# Patient Record
Sex: MALE | Race: WHITE | Employment: FULL TIME | URBAN - METROPOLITAN AREA
[De-identification: names, ages, dates, MRNs, and addresses within clinical notes are randomized per-mention and may not be internally consistent; named-entity substitution may affect disease eponyms.]

---

## 2024-01-18 ENCOUNTER — HOSPITAL ENCOUNTER (EMERGENCY)
Age: 44
Discharge: ANOTHER HOSPITAL | End: 2024-01-18
Attending: EMERGENCY MEDICINE
Payer: COMMERCIAL

## 2024-01-18 ENCOUNTER — HOSPITAL ENCOUNTER (INPATIENT)
Age: 44
LOS: 1 days | Discharge: HOME | End: 2024-01-19
Attending: PSYCHIATRY & NEUROLOGY | Admitting: PSYCHIATRY & NEUROLOGY
Payer: COMMERCIAL

## 2024-01-18 VITALS
SYSTOLIC BLOOD PRESSURE: 152 MMHG | HEART RATE: 80 BPM | OXYGEN SATURATION: 99 % | WEIGHT: 265 LBS | TEMPERATURE: 98.9 F | DIASTOLIC BLOOD PRESSURE: 89 MMHG | HEIGHT: 70 IN | RESPIRATION RATE: 20 BRPM | BODY MASS INDEX: 37.94 KG/M2

## 2024-01-18 DIAGNOSIS — R45.851 SUICIDAL IDEATION: Primary | ICD-10-CM

## 2024-01-18 PROBLEM — F33.9 MAJOR DEPRESSIVE DISORDER, RECURRENT (HCC): Status: ACTIVE | Noted: 2024-01-18

## 2024-01-18 PROBLEM — F33.9 MAJOR DEPRESSION, RECURRENT (HCC): Status: ACTIVE | Noted: 2024-01-18

## 2024-01-18 LAB
AMPHETAMINES UR QL SCN>1000 NG/ML: ABNORMAL
ANION GAP SERPL CALCULATED.3IONS-SCNC: 9 MMOL/L (ref 3–16)
APAP SERPL-MCNC: <5 UG/ML (ref 10–30)
BARBITURATES UR QL SCN>200 NG/ML: ABNORMAL
BASOPHILS # BLD: 0.1 K/UL (ref 0–0.2)
BASOPHILS NFR BLD: 1 %
BENZODIAZ UR QL SCN>200 NG/ML: ABNORMAL
BUN SERPL-MCNC: 15 MG/DL (ref 7–20)
CALCIUM SERPL-MCNC: 9.8 MG/DL (ref 8.3–10.6)
CANNABINOIDS UR QL SCN>50 NG/ML: POSITIVE
CHLORIDE SERPL-SCNC: 102 MMOL/L (ref 99–110)
CO2 SERPL-SCNC: 25 MMOL/L (ref 21–32)
COCAINE UR QL SCN: ABNORMAL
CREAT SERPL-MCNC: 0.9 MG/DL (ref 0.9–1.3)
DEPRECATED RDW RBC AUTO: 13.5 % (ref 12.4–15.4)
DRUG SCREEN COMMENT UR-IMP: ABNORMAL
EOSINOPHIL # BLD: 0.1 K/UL (ref 0–0.6)
EOSINOPHIL NFR BLD: 1 %
ETHANOLAMINE SERPL-MCNC: NORMAL MG/DL (ref 0–0.08)
FENTANYL SCREEN, URINE: ABNORMAL
FLUAV RNA RESP QL NAA+PROBE: NOT DETECTED
FLUBV RNA RESP QL NAA+PROBE: NOT DETECTED
GFR SERPLBLD CREATININE-BSD FMLA CKD-EPI: >60 ML/MIN/{1.73_M2}
GLUCOSE BLD-MCNC: 168 MG/DL (ref 70–99)
GLUCOSE BLD-MCNC: 78 MG/DL (ref 70–99)
GLUCOSE SERPL-MCNC: 107 MG/DL (ref 70–99)
HCT VFR BLD AUTO: 45.7 % (ref 40.5–52.5)
HGB BLD-MCNC: 15.7 G/DL (ref 13.5–17.5)
LYMPHOCYTES # BLD: 1.5 K/UL (ref 1–5.1)
LYMPHOCYTES NFR BLD: 17.1 %
MCH RBC QN AUTO: 29.9 PG (ref 26–34)
MCHC RBC AUTO-ENTMCNC: 34.3 G/DL (ref 31–36)
MCV RBC AUTO: 87.2 FL (ref 80–100)
METHADONE UR QL SCN>300 NG/ML: ABNORMAL
MONOCYTES # BLD: 0.4 K/UL (ref 0–1.3)
MONOCYTES NFR BLD: 4.9 %
NEUTROPHILS # BLD: 6.7 K/UL (ref 1.7–7.7)
NEUTROPHILS NFR BLD: 76 %
OPIATES UR QL SCN>300 NG/ML: ABNORMAL
OXYCODONE UR QL SCN: ABNORMAL
PCP UR QL SCN>25 NG/ML: ABNORMAL
PERFORMED ON: ABNORMAL
PERFORMED ON: NORMAL
PH UR STRIP: 6 [PH]
PLATELET # BLD AUTO: 330 K/UL (ref 135–450)
PMV BLD AUTO: 6.9 FL (ref 5–10.5)
POTASSIUM SERPL-SCNC: 3.9 MMOL/L (ref 3.5–5.1)
RBC # BLD AUTO: 5.23 M/UL (ref 4.2–5.9)
SALICYLATES SERPL-MCNC: <0.3 MG/DL (ref 15–30)
SARS-COV-2 RNA RESP QL NAA+PROBE: NOT DETECTED
SODIUM SERPL-SCNC: 136 MMOL/L (ref 136–145)
WBC # BLD AUTO: 8.9 K/UL (ref 4–11)

## 2024-01-18 PROCEDURE — 1240000000 HC EMOTIONAL WELLNESS R&B

## 2024-01-18 PROCEDURE — 90791 PSYCH DIAGNOSTIC EVALUATION: CPT | Performed by: SOCIAL WORKER

## 2024-01-18 PROCEDURE — 80179 DRUG ASSAY SALICYLATE: CPT

## 2024-01-18 PROCEDURE — 82077 ASSAY SPEC XCP UR&BREATH IA: CPT

## 2024-01-18 PROCEDURE — 99285 EMERGENCY DEPT VISIT HI MDM: CPT

## 2024-01-18 PROCEDURE — 85025 COMPLETE CBC W/AUTO DIFF WBC: CPT

## 2024-01-18 PROCEDURE — 80048 BASIC METABOLIC PNL TOTAL CA: CPT

## 2024-01-18 PROCEDURE — 6370000000 HC RX 637 (ALT 250 FOR IP): Performed by: NURSE PRACTITIONER

## 2024-01-18 PROCEDURE — 6370000000 HC RX 637 (ALT 250 FOR IP): Performed by: EMERGENCY MEDICINE

## 2024-01-18 PROCEDURE — 87636 SARSCOV2 & INF A&B AMP PRB: CPT

## 2024-01-18 PROCEDURE — 80143 DRUG ASSAY ACETAMINOPHEN: CPT

## 2024-01-18 PROCEDURE — 80307 DRUG TEST PRSMV CHEM ANLYZR: CPT

## 2024-01-18 RX ORDER — LISINOPRIL 20 MG/1
40 TABLET ORAL ONCE
Status: COMPLETED | OUTPATIENT
Start: 2024-01-18 | End: 2024-01-18

## 2024-01-18 RX ORDER — POLYETHYLENE GLYCOL 3350 17 G
2 POWDER IN PACKET (EA) ORAL
Status: CANCELLED | OUTPATIENT
Start: 2024-01-18

## 2024-01-18 RX ORDER — IBUPROFEN 400 MG/1
400 TABLET ORAL EVERY 6 HOURS PRN
Status: CANCELLED | OUTPATIENT
Start: 2024-01-18

## 2024-01-18 RX ORDER — BENZTROPINE MESYLATE 1 MG/ML
2 INJECTION INTRAMUSCULAR; INTRAVENOUS 2 TIMES DAILY PRN
Status: CANCELLED | OUTPATIENT
Start: 2024-01-18

## 2024-01-18 RX ORDER — ACETAMINOPHEN 325 MG/1
650 TABLET ORAL EVERY 4 HOURS PRN
Status: CANCELLED | OUTPATIENT
Start: 2024-01-18

## 2024-01-18 RX ORDER — IBUPROFEN 400 MG/1
400 TABLET ORAL EVERY 6 HOURS PRN
Status: DISCONTINUED | OUTPATIENT
Start: 2024-01-18 | End: 2024-01-19 | Stop reason: HOSPADM

## 2024-01-18 RX ORDER — LORAZEPAM 1 MG/1
2 TABLET ORAL ONCE
Status: COMPLETED | OUTPATIENT
Start: 2024-01-18 | End: 2024-01-18

## 2024-01-18 RX ORDER — BENZTROPINE MESYLATE 1 MG/ML
2 INJECTION INTRAMUSCULAR; INTRAVENOUS 2 TIMES DAILY PRN
Status: DISCONTINUED | OUTPATIENT
Start: 2024-01-18 | End: 2024-01-19 | Stop reason: HOSPADM

## 2024-01-18 RX ORDER — POLYETHYLENE GLYCOL 3350 17 G
2 POWDER IN PACKET (EA) ORAL
Status: DISCONTINUED | OUTPATIENT
Start: 2024-01-18 | End: 2024-01-19 | Stop reason: HOSPADM

## 2024-01-18 RX ORDER — NICOTINE 21 MG/24HR
1 PATCH, TRANSDERMAL 24 HOURS TRANSDERMAL DAILY
Status: CANCELLED | OUTPATIENT
Start: 2024-01-18

## 2024-01-18 RX ORDER — OLANZAPINE 10 MG/1
10 TABLET ORAL EVERY 4 HOURS PRN
Status: DISCONTINUED | OUTPATIENT
Start: 2024-01-18 | End: 2024-01-19 | Stop reason: HOSPADM

## 2024-01-18 RX ORDER — MAGNESIUM HYDROXIDE/ALUMINUM HYDROXICE/SIMETHICONE 120; 1200; 1200 MG/30ML; MG/30ML; MG/30ML
30 SUSPENSION ORAL EVERY 6 HOURS PRN
Status: DISCONTINUED | OUTPATIENT
Start: 2024-01-18 | End: 2024-01-19 | Stop reason: HOSPADM

## 2024-01-18 RX ORDER — NICOTINE 21 MG/24HR
1 PATCH, TRANSDERMAL 24 HOURS TRANSDERMAL DAILY
Status: DISCONTINUED | OUTPATIENT
Start: 2024-01-19 | End: 2024-01-19 | Stop reason: HOSPADM

## 2024-01-18 RX ORDER — INSULIN GLARGINE 100 [IU]/ML
70 INJECTION, SOLUTION SUBCUTANEOUS EVERY MORNING
COMMUNITY

## 2024-01-18 RX ORDER — ACETAMINOPHEN 325 MG/1
650 TABLET ORAL EVERY 4 HOURS PRN
Status: DISCONTINUED | OUTPATIENT
Start: 2024-01-18 | End: 2024-01-19 | Stop reason: HOSPADM

## 2024-01-18 RX ORDER — MAGNESIUM HYDROXIDE/ALUMINUM HYDROXICE/SIMETHICONE 120; 1200; 1200 MG/30ML; MG/30ML; MG/30ML
30 SUSPENSION ORAL EVERY 6 HOURS PRN
Status: CANCELLED | OUTPATIENT
Start: 2024-01-18

## 2024-01-18 RX ORDER — OLANZAPINE 5 MG/1
10 TABLET ORAL EVERY 4 HOURS PRN
Status: CANCELLED | OUTPATIENT
Start: 2024-01-18

## 2024-01-18 RX ADMIN — LISINOPRIL 40 MG: 20 TABLET ORAL at 20:50

## 2024-01-18 RX ADMIN — LORAZEPAM 2 MG: 1 TABLET ORAL at 12:49

## 2024-01-18 ASSESSMENT — SLEEP AND FATIGUE QUESTIONNAIRES
DO YOU USE A SLEEP AID: NO
SLEEP PATTERN: DIFFICULTY FALLING ASLEEP;DISTURBED/INTERRUPTED SLEEP;RESTLESSNESS;NIGHTMARES/TERRORS
DO YOU USE A SLEEP AID: COMMENT
AVERAGE NUMBER OF SLEEP HOURS: 5
SLEEP PATTERN: DIFFICULTY FALLING ASLEEP;DISTURBED/INTERRUPTED SLEEP
DO YOU HAVE DIFFICULTY SLEEPING: YES
DO YOU HAVE DIFFICULTY SLEEPING: YES
AVERAGE NUMBER OF SLEEP HOURS: 5

## 2024-01-18 ASSESSMENT — PAIN SCALES - GENERAL: PAINLEVEL_OUTOF10: 0

## 2024-01-18 ASSESSMENT — PATIENT HEALTH QUESTIONNAIRE - PHQ9
SUM OF ALL RESPONSES TO PHQ QUESTIONS 1-9: 2
2. FEELING DOWN, DEPRESSED OR HOPELESS: 1
SUM OF ALL RESPONSES TO PHQ QUESTIONS 1-9: 2
SUM OF ALL RESPONSES TO PHQ QUESTIONS 1-9: 2
1. LITTLE INTEREST OR PLEASURE IN DOING THINGS: 1
SUM OF ALL RESPONSES TO PHQ9 QUESTIONS 1 & 2: 2
SUM OF ALL RESPONSES TO PHQ QUESTIONS 1-9: 2

## 2024-01-18 ASSESSMENT — PAIN - FUNCTIONAL ASSESSMENT: PAIN_FUNCTIONAL_ASSESSMENT: 0-10

## 2024-01-18 ASSESSMENT — LIFESTYLE VARIABLES
HOW MANY STANDARD DRINKS CONTAINING ALCOHOL DO YOU HAVE ON A TYPICAL DAY: 1 OR 2
HOW OFTEN DO YOU HAVE A DRINK CONTAINING ALCOHOL: MONTHLY OR LESS

## 2024-01-18 NOTE — ED NOTES
All clothing removed and placed in a patient belonging bag. This includes pants, belt, shoes, socks, shirt, and jacket. All items are outside the door in a belongings bag.  Handoff given to RONDA Valladares who is sitting with patient at this time. Patient's wife is at his bedside. Her personal belongings are outside the door as well.

## 2024-01-18 NOTE — ED PROVIDER NOTES
Emergency Department Attending Physician Note  Location: [facility]  ED  1/18/2024       Pt Name: Jordan Hernandez  MRN: [id number]  Birthdate 1980    Date of evaluation: 1/18/2024  Provider: Dustin Auguste MD  PCP: No primary care provider on file.    Note Started: 11:30 AM EST 1/18/24    CHIEF COMPLAINT  Chief Complaint   Patient presents with    Suicidal     Patient tearful at triage. Pt reports he's having thoughts of killing himself as long as he can remember. Pt hx of PTSD and reports he's had depression since his daughter passed away in 2018. Says he's thought about going into the woods and hanging himself. Dr Gee at triage with patient speaking to him. Patient has a good support system. Wife with him at triage.        HISTORY OF PRESENT ILLNESS:  History obtained by patient. Limitations to history : None.     Jordan Hernandez is a 43 y.o. male with a significant PMHx of PTSD presenting for evaluation of suicidal thoughts. Patient reported multiple stressors in life, including work and family. Patient reported losing multiple family members, including his daughter in 2018. Patient stated he has SI for a prolong period of time, without plan or intent to harm. Patient stated that with his current SI, he wants to hang himself in his backyard. Patient denies any HI to anyone around him. Patient denies any ingestion of drugs beside marijuana. Patient is tearful on presentation. Patient denies history of previous suicide attempts..     Nursing Notes were all reviewed and agreed with or any disagreements were addressed in the HPI.      MEDICAL HISTORY  Past Medical History:   Diagnosis Date    PTSD (post-traumatic stress disorder)         SURGICAL HISTORY  Past Surgical History:   Procedure Laterality Date    CHOLECYSTECTOMY         CURRENT MEDICATIONS  Previous Medications    No medications on file       ALLERGIES  Patient has no known allergies.    FAMILYHISTORY  History reviewed. No

## 2024-01-18 NOTE — CARE COORDINATION
Referral to transfer center for psych. Transfer.  Electronically signed by MELISSA Díaz on 1/18/2024 at 1:54 PM

## 2024-01-18 NOTE — ED NOTES
Patient tearfully stated \"on the way here I thought about just jerking the wheel but I didn't want to hurt someone else because I'm having a shitty life right now\" Patient also stated that \"getting help feels embarrasing\" sitter reassured patient that they are heppy he is getting help.    DISPLAY PLAN FREE TEXT

## 2024-01-18 NOTE — ED PROVIDER NOTES
I independently evaluated and obtained a history and physical on Jordan Hernandez.    I personally saw the patient and made/approved the management plan and take responsibility for the patient management.    All diagnostic, treatment, and disposition assistants were made to myself in conjunction the resident physician.    For further details of this patient's emergency department encounter, please see the resident physician's documentation.    History: 43-year-old male who reports he has been diagnosed with PTSD from prior  service who reports multiple stressors including his daughter passing away 6 years ago and other family members dying as well who reports mounting stress and thoughts of suicide.  Patient reports that thoughts of suicide have been ongoing for a prolonged period of time but now he has an acute specific plan to hang himself in his backyard.  Patient denies any ingestions or self-harm prior to coming to the emergency department.  Patient denies any fever or medical symptoms.  Patient reports that he is scared that he might harm himself if he does not come in and therefore is coming and ask for help.    Physician Exam: Tearful emotionally distressed middle-aged  male.  Nontoxic-appearing.  Awake and alert.  Moves all 4 extremities spontaneously.  Reports active suicidal ideation with plan.    MDM: Patient has active suicidal ideation with plan and therefore was placed on a 72-hour hold for his own safety.  Patient is agreeable to take p.o. Ativan for his acute anxiety.  Standard psychiatric labs ordered do not show any acute emergent abnormalities.  Patient is medically cleared and appropriate for psychiatric evaluation.  Psychiatric evaluation takes place and they recommend inpatient admission given active suicidal ideation with plan and numerous risk factors.  I have discussed this with the patient who expresses understanding and agreement with this plan.      FINAL IMPRESSION

## 2024-01-18 NOTE — VIRTUAL HEALTH
high risk on CSSRS with plan to drive into oncoming traffic reporting,  \"I feel like I want to kill myself, I don't want to be here, today is the closest I have been to actually doing it. I kept thinking about pulling the wheel. I have been dealing with it with it for 20 years.\" Pt continues to meet the criteria for hold at this time. Telepsych recommends for pt to be admitted to inpatient facility with ED provider, Justin Gee MD, in agreement.         Jordan Hernandez, was evaluated through a synchronous (real-time) audio-video encounter. The patient (and/or guardian if applicable) is aware that this is a billable service, which includes applicable co-pays. This virtual visit was conducted with patient's (and/or legal guardian's) consent. Patient identification was verified, and a caregiver was present when appropriate.  The patient was located at Facility (Appt Department): [facility]  ED  [address] [facility] [address]  Loc: [phone]  The provider was located at Home (City/State): [place], [place]    [place] Consult to Tele-Psych  Consult performed by: Gareth Shen LCSW  Consult ordered by: Justin Gee MD           Total time spent on this encounter: Not billed by time    --Gareth Shen LCSW on 1/18/2024 at 12:03 PM    An electronic signature was used to authenticate this note.

## 2024-01-19 VITALS
SYSTOLIC BLOOD PRESSURE: 132 MMHG | WEIGHT: 264.6 LBS | BODY MASS INDEX: 37.88 KG/M2 | OXYGEN SATURATION: 96 % | HEIGHT: 70 IN | RESPIRATION RATE: 16 BRPM | HEART RATE: 89 BPM | DIASTOLIC BLOOD PRESSURE: 67 MMHG | TEMPERATURE: 97.7 F

## 2024-01-19 PROBLEM — G47.30 SLEEP APNEA, UNSPECIFIED: Status: ACTIVE | Noted: 2024-01-19

## 2024-01-19 PROBLEM — F43.12 POST-TRAUMATIC STRESS DISORDER, CHRONIC: Status: ACTIVE | Noted: 2024-01-19

## 2024-01-19 PROBLEM — E78.5 HYPERLIPIDEMIA: Status: ACTIVE | Noted: 2024-01-19

## 2024-01-19 PROBLEM — E11.9 TYPE 2 DIABETES MELLITUS (HCC): Status: ACTIVE | Noted: 2024-01-19

## 2024-01-19 PROBLEM — F33.2 SEVERE EPISODE OF RECURRENT MAJOR DEPRESSIVE DISORDER, WITHOUT PSYCHOTIC FEATURES (HCC): Status: ACTIVE | Noted: 2024-01-18

## 2024-01-19 PROBLEM — I10 ESSENTIAL (PRIMARY) HYPERTENSION: Status: ACTIVE | Noted: 2024-01-19

## 2024-01-19 PROBLEM — E66.01 MORBID OBESITY (HCC): Status: ACTIVE | Noted: 2024-01-19

## 2024-01-19 PROBLEM — K21.9 GASTROESOPHAGEAL REFLUX DISEASE: Status: ACTIVE | Noted: 2024-01-19

## 2024-01-19 LAB
CHOLEST SERPL-MCNC: 163 MG/DL (ref 0–199)
GLUCOSE BLD-MCNC: 137 MG/DL (ref 70–99)
GLUCOSE BLD-MCNC: 138 MG/DL (ref 70–99)
GLUCOSE BLD-MCNC: 150 MG/DL (ref 70–99)
HDLC SERPL-MCNC: 36 MG/DL (ref 40–60)
LDLC SERPL CALC-MCNC: 85 MG/DL
PERFORMED ON: ABNORMAL
TRIGL SERPL-MCNC: 208 MG/DL (ref 0–150)
TSH SERPL DL<=0.005 MIU/L-ACNC: 1.01 UIU/ML (ref 0.27–4.2)
VLDLC SERPL CALC-MCNC: 42 MG/DL

## 2024-01-19 PROCEDURE — 6370000000 HC RX 637 (ALT 250 FOR IP): Performed by: PSYCHIATRY & NEUROLOGY

## 2024-01-19 PROCEDURE — 5130000000 HC BRIDGE APPOINTMENT

## 2024-01-19 PROCEDURE — 84443 ASSAY THYROID STIM HORMONE: CPT

## 2024-01-19 PROCEDURE — 6370000000 HC RX 637 (ALT 250 FOR IP): Performed by: NURSE PRACTITIONER

## 2024-01-19 PROCEDURE — 36415 COLL VENOUS BLD VENIPUNCTURE: CPT

## 2024-01-19 PROCEDURE — 99223 1ST HOSP IP/OBS HIGH 75: CPT | Performed by: PSYCHIATRY & NEUROLOGY

## 2024-01-19 PROCEDURE — 99221 1ST HOSP IP/OBS SF/LOW 40: CPT | Performed by: NURSE PRACTITIONER

## 2024-01-19 PROCEDURE — 80061 LIPID PANEL: CPT

## 2024-01-19 PROCEDURE — 83036 HEMOGLOBIN GLYCOSYLATED A1C: CPT

## 2024-01-19 RX ORDER — INSULIN LISPRO 100 [IU]/ML
20 INJECTION, SOLUTION INTRAVENOUS; SUBCUTANEOUS
Qty: 1 EACH | Refills: 0
Start: 2024-01-20

## 2024-01-19 RX ORDER — PRAVASTATIN SODIUM 40 MG
40 TABLET ORAL NIGHTLY
Status: DISCONTINUED | OUTPATIENT
Start: 2024-01-19 | End: 2024-01-19 | Stop reason: HOSPADM

## 2024-01-19 RX ORDER — INSULIN LISPRO 100 [IU]/ML
20 INJECTION, SOLUTION INTRAVENOUS; SUBCUTANEOUS
Status: DISCONTINUED | OUTPATIENT
Start: 2024-01-19 | End: 2024-01-19 | Stop reason: HOSPADM

## 2024-01-19 RX ORDER — PANTOPRAZOLE SODIUM 40 MG/1
40 TABLET, DELAYED RELEASE ORAL
Status: DISCONTINUED | OUTPATIENT
Start: 2024-01-20 | End: 2024-01-19 | Stop reason: HOSPADM

## 2024-01-19 RX ORDER — DEXTROSE MONOHYDRATE 100 MG/ML
INJECTION, SOLUTION INTRAVENOUS CONTINUOUS PRN
Status: DISCONTINUED | OUTPATIENT
Start: 2024-01-19 | End: 2024-01-19 | Stop reason: HOSPADM

## 2024-01-19 RX ORDER — PANTOPRAZOLE SODIUM 40 MG/1
40 TABLET, DELAYED RELEASE ORAL
Qty: 30 TABLET | Refills: 0
Start: 2024-01-20

## 2024-01-19 RX ORDER — GLUCAGON 1 MG/ML
1 KIT INJECTION PRN
Status: DISCONTINUED | OUTPATIENT
Start: 2024-01-19 | End: 2024-01-19 | Stop reason: HOSPADM

## 2024-01-19 RX ORDER — LISINOPRIL 40 MG/1
40 TABLET ORAL DAILY
Qty: 30 TABLET | Refills: 0
Start: 2024-01-20

## 2024-01-19 RX ORDER — LISINOPRIL 20 MG/1
40 TABLET ORAL DAILY
Status: DISCONTINUED | OUTPATIENT
Start: 2024-01-19 | End: 2024-01-19 | Stop reason: HOSPADM

## 2024-01-19 RX ORDER — PRAVASTATIN SODIUM 40 MG
40 TABLET ORAL NIGHTLY
Qty: 30 TABLET | Refills: 0
Start: 2024-01-19

## 2024-01-19 RX ORDER — INSULIN LISPRO 100 [IU]/ML
25 INJECTION, SOLUTION INTRAVENOUS; SUBCUTANEOUS
Status: DISCONTINUED | OUTPATIENT
Start: 2024-01-19 | End: 2024-01-19 | Stop reason: HOSPADM

## 2024-01-19 RX ORDER — INSULIN LISPRO 100 [IU]/ML
25 INJECTION, SOLUTION INTRAVENOUS; SUBCUTANEOUS
Qty: 1 EACH | Refills: 0
Start: 2024-01-19

## 2024-01-19 RX ORDER — INSULIN GLARGINE 100 [IU]/ML
70 INJECTION, SOLUTION SUBCUTANEOUS DAILY
Status: DISCONTINUED | OUTPATIENT
Start: 2024-01-19 | End: 2024-01-19 | Stop reason: HOSPADM

## 2024-01-19 RX ADMIN — INSULIN LISPRO 20 UNITS: 100 INJECTION, SOLUTION INTRAVENOUS; SUBCUTANEOUS at 12:27

## 2024-01-19 RX ADMIN — INSULIN GLARGINE 70 UNITS: 100 INJECTION, SOLUTION SUBCUTANEOUS at 09:52

## 2024-01-19 RX ADMIN — SERTRALINE HYDROCHLORIDE 25 MG: 50 TABLET ORAL at 15:15

## 2024-01-19 RX ADMIN — LISINOPRIL 40 MG: 20 TABLET ORAL at 13:50

## 2024-01-19 ASSESSMENT — SLEEP AND FATIGUE QUESTIONNAIRES
DO YOU USE A SLEEP AID: NO
DO YOU HAVE DIFFICULTY SLEEPING: YES
AVERAGE NUMBER OF SLEEP HOURS: 5
SLEEP PATTERN: DIFFICULTY FALLING ASLEEP
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
## 2024-01-19 NOTE — PROGRESS NOTES
[facility] [place] listed as pharmacy. Unable to reach them at this time. Primary nurse updating home medications per pt report.

## 2024-01-19 NOTE — BH NOTE
[facility] [place]  Discharge Note    Pt discharged with followings belongings:   Dental Appliances: None  Vision - Corrective Lenses: Eyeglasses  Hearing Aid: None  Jewelry: None  Body Piercings Removed: N/A  Clothing: Belt, Jacket/Coat, Pants, Socks, Footwear  Other Valuables: Other (Comment) (na)   Valuables returned to patient. Patient educated on aftercare instructions: Yes  Information faxed to [facility] and [facility] by BARRY Rivera  at 4:28 PM .Patient verbalize understanding of AVS:  Yes.    Status EXAM upon discharge:  Mental Status and Behavioral Exam  Normal: No  Level of Assistance: Independent/Self  Facial Expression: Brightened  Affect: Appropriate, Congruent  Level of Consciousness: Alert  Frequency of Checks: 4 times per hour, close  Mood:Normal: No  Mood: Anxious, Angry  Motor Activity:Normal: Yes  Eye Contact: Good  Observed Behavior: Cooperative, Friendly  Sexual Misconduct History: Current - no  Preception: Murray to person, Murray to time, Murray to place, Murray to situation  Attention:Normal: Yes  Attention: Distractible  Thought Processes: Unremarkable (Linear)  Thought Content:Normal: Yes  Depression Symptoms: Increased irritability  Anxiety Symptoms: Generalized  Lyn Symptoms: No problems reported or observed.  Hallucinations: None  Delusions: No  Memory:Normal: Yes  Insight and Judgment: No  Insight and Judgment: Poor judgment    Tobacco Screening:  Practical Counseling, on admission, mary X, if applicable and completed (first 3 are required if patient doesn't refuse):  Pt does not smoke          ( ) Recognizing danger situations (included triggers and roadblocks)                    ( ) Coping skills (new ways to manage stress,relaxation techniques, changing routine, distraction)                                                           ( ) Basic information about quitting (benefits of quitting, techniques in how to quit, available resources  ( ) Referral for counseling faxed to

## 2024-01-19 NOTE — TRANSITION OF CARE
Behavioral Health Transition Record to Provider    Patient Name: Jordan Hernandez  YOB: 1980   Medical Record Number: [id number]  Date of Admission: 1/18/2024  7:51 PM   Date of Discharge: 1/19/24    Attending Provider: Wei Gunderson MD   Discharging Provider: Dr. Gunderson  To contact this individual call [phone] and ask the  to page.  If unavailable, ask to be transferred to Behavioral Health Provider on call.  A Behavioral Health Provider will be available on call 24/7 and during holidays.    Primary Care Provider: No primary care provider on file.    No Known Allergies    Reason for Admission: MDD    Admission Diagnosis: Major depression, recurrent (HCC) [F33.9]  Major depressive disorder, recurrent (HCC) [F33.9]    * No surgery found *    Results for orders placed or performed during the hospital encounter of 01/18/24   TSH   Result Value Ref Range    TSH 1.01 0.27 - 4.20 uIU/mL   POCT Glucose   Result Value Ref Range    POC Glucose 168 (H) 70 - 99 mg/dl    Performed on ACCU-CHEK    POCT Glucose   Result Value Ref Range    POC Glucose 138 (H) 70 - 99 mg/dl    Performed on ACCU-CHEK    POCT Glucose   Result Value Ref Range    POC Glucose 137 (H) 70 - 99 mg/dl    Performed on ACCU-CHEK    POCT Glucose   Result Value Ref Range    POC Glucose 150 (H) 70 - 99 mg/dl    Performed on ACCU-CHEK        Immunizations administered during this encounter:   There is no immunization history on file for this patient.  Documentation of patient's or caregiver's refusal of influenza vaccine.    Screening for Metabolic Disorders for Patients on Antipsychotic Medications  (Data obtained from the EMR)    Estimated Body Mass Index  Body mass index is 37.97 kg/m².      Vital Signs/Blood Pressure  /67   Pulse 89   Temp 97.7 °F (36.5 °C) (Oral)   Resp 16   Ht 1.778 m (5' 10\")   Wt 120 kg (264 lb 9.6 oz)   SpO2 96%   BMI 37.97 kg/m²      Fasting Blood Glucose or Hemoglobin A1c  No results

## 2024-01-19 NOTE — PROGRESS NOTES
Writer unable to verify home medications, but pt states he takes these medications as follows:    Insulin Glargine- 70 units every morning    Novolog- 20 units at breakfast, 20 units at lunch, 20 units at dinner    Metformin- 500 mg twice BID, morning/night    Omeprazole- 20 mg daily every morning    Pravastatin- 20 mg daily every morning    Lisinopril- 40 mg daily every morning    Jardiance- 10 mg daily every morning      Pt states the only medications he was able to take on 01/17/2024 prior to admission was the breakfast and lunch dose of Novolog.     Pt BS for this shift as follows:  3282- 031  0100- 138  8780-

## 2024-01-19 NOTE — PROGRESS NOTES
CSSR-S Assessment completed with patient at 2002 who then scored HIGH.    Mamie Velazquez was notified of HIGH score, via telephone, at 2020.    Were suicide precautions ordered: NO    If not ordered, justification as follows: Pt currently denies all SI/HI/AVH. Pt states he feels better coming here, he feels safe here, and will not hurt himself. Pt agreed to come talk to staff immediately if this changes.     Completed by Tess Lee

## 2024-01-19 NOTE — H&P
Hospital Medicine History & Physical      PCP: No primary care provider on file.    Date of Admission: 1/18/2024    Date of Service: Pt seen/examined on 01/19/24      Chief Complaint:  SI        History Of Present Illness:      The patient is a 43 y.o. male with PMH of PTSD, DM, HTN, ANEL, HLD and GERD who presented to [facility] for SI.  Patient was seen and evaluated in the ED by the ED medical provider, patient was medically cleared for admission to [facility].  This note serves as an admission medical H&P.    Tobacco use: denies  ETOH use: denies  Illicit drug use: medical marijuana     Patient denies any medical complaints     Past Medical History:        Diagnosis Date    PTSD (post-traumatic stress disorder)        Past Surgical History:        Procedure Laterality Date    CHOLECYSTECTOMY         Medications Prior to Admission:    Prior to Admission medications    Medication Sig Start Date End Date Taking? Authorizing Provider   insulin glargine (LANTUS) 100 UNIT/ML injection vial Inject 70 Units into the skin every morning   Yes Provider, MD Courtney       Allergies:  Patient has no known allergies.    Social History:  The patient currently lives home     TOBACCO:   reports that he has never smoked. He has never used smokeless tobacco.  ETOH:   reports current alcohol use.      Family History:   Positive as follows:    History reviewed. No pertinent family history.    REVIEW OF SYSTEMS:       Constitutional: Negative for fever   HENT: Negative for sore throat   Eyes: Negative for redness   Respiratory: Negative  for dyspnea, cough   Cardiovascular: Negative for chest pain   Gastrointestinal: Negative for vomiting, diarrhea   Genitourinary: Negative for hematuria   Musculoskeletal: Negative for arthralgias   Skin: Negative for rash +scars bilateral legs   Neurological: Negative for syncope    Hematological: Negative for easy bruising/bleeding   Psychiatric/Behavorial: Per psychiatry team evaluation     PHYSICAL  influenza A, and influenza B in nasopharyngeal  and nasal swab specimens for use under the FDA’s Emergency Use  Authorization (EUA) only.    Patient Fact Sheet:  https://www.fda.gov/media/378020/download  Provider Fact Sheet: https://www.fda.gov/media/321050/download  EUA: https://www.fda.gov/media/861708/download  IFU: https://www.fda.gov/media/969170/download    Methodology:  RT-PCR          INFLUENZA A NOT DETECTED     INFLUENZA B NOT DETECTED             EKG:      N/A     RADIOLOGY    No orders to display        ASSESSMENT/PLAN:    SI  PTSD  Bipolar disorder   - per psychiatry team      DM type 2  - A1c was 7.9 in 8/2023  - continue home regimen, reordered this am  - monitor glucose  - carb control diet       HTN  - elevated on admission   - resumed Lisinopril as patient stated he has been taking  - was given x1 dose of Lisinopril last night   - monitor blood pressure after resuming home medication     ANEL  - ordered home BiPAP    GERD  - Continue PPI        HLD  - Continue statin      Obesity  - Body mass index is 37.97 kg/m².  - Recommend weight loss     Medical Marijuana       Pt has no medical complaints at this time. They were informed that should a medical concern arise during their admission they may have [facility] contact us.        JIL Boswell - CNP   1/19/2024

## 2024-01-19 NOTE — DISCHARGE INSTRUCTIONS
Advanced Directives:  Patient does not have a surrogate decision maker appointed   Name (if yes): N/A   Phone Number: N/A  Patient does not have a psychiatric and/ or medical advanced directive or power of .   Patient was not offered psychiatric and/ or medical advanced directive or power of  information/completion but declined to complete   Why not? Not clinically appropriate at this time    Discharge Planning is Complete.    Discharge Date: 01/19/2024  Reason for Hospitalization: A 42 yo male presenting with worsening depression and anxiety and SI with plan to hang himself secondary to multiple new life stressors.   Discharge Diagnosis: Major Depressive Disorder, Recurrent, Severe, Without Psychotic Features  Discharging to: Private Residence    Your instructions:  Your physician here was Wei Gunderson MD. If you have any questions please call the unit at [phone] for the adult unit or [phone] for [facility].    Please note that we have a patient family advisory [place] that meets the second Wednesday of January and the second Wednesday of July at 5pm in [facility]. Department leadership would love for you to attend to give feedback on what we are doing well and areas in which we can improve our patient care. Please come if you are able and feel free to reach out to Behavioral [facility] at [phone] with any questions.     The crisis number for [facility] is [phone] (SAVE). This crisis line is available 24 hours a day, seven days a week.     Please follow up with your PCP regarding any pending labs.     Your next appointment is:  Name of Provider: [facility] - Primary Care  Provider specialty/license: Mental Health   Date and time of appointment: To Be Determined   The type/s of services requested are: Hospital Follow-Up  Agency Name: [facility]  Address: [address] [place] [address]  [address], [place] [address]   Phone Number: [phone]   Special instructions (what to bring to appointment, etc.): We have notified the office of your hospitalization, and they will contact you directly by phone to schedule a follow-up appointment.        Other appointments:   Name of Provider: [facility] Suicide [facility]   Provider specialty/license: Mental Health   Date and time of appointment: 1-2 business days   The type/s of services requested are: Hospital Follow-Up  [facility]  Address: [address] [place] [id number]  Phone Number: [phone]  Special instructions (what to bring to appointment, etc.): A member of the team will reach out to you by phone in 1-2 business days to offer support and help you get connected with services.         Name of Provider: YVONNE Caballero [facility]   Provider specialty/license: Counselor   Date and time of appointment: Saturday, January 27, 2024 at 09:00 am   The type/s of services requested are: Assessment & Treatment  Agency Name: [facility]  Address:  [address] [place] [address], [facility] [address], [place] [address]  Phone Number: [phone]   Special instructions (what to bring to appointment, etc.): Please bring a photo ID, insurance card, and co-pay with you to this appointment. Please contact the office at least 48 hrs in advance if you need to cancel or reschedule this appointment for any reason.      Grief Support Groups:  [facility]  Location: [facility], [place] [address]   Phone: [phone]     [facility]  Grief and Loss  Grief and loss programs/support groups serving the communities of [place], [facility] and [facility].  Contact: Mary Alice Abad at [phone] or [email or web address] for current listing of groups.    Grief Share: Journey from Mourning to Carolyn  Non-denomination group for those grieving the death of someone close.

## 2024-01-19 NOTE — PLAN OF CARE
Pt arrived on the unit at 1930 accompanied by security and staff. Pt A/Ox4, independent.     Pt admitted for SI with plan to hang self. Upon assessment from writer, pt currently denies all SI/HI/AVH. Pt tearful and states he is sad it has come to him being admitted here, but that he needs the help and is glad to take that step. Pt states he is sad, depressed, has racing thoughts, and has a lot on his plate. Pt states he had a special needs daughter that passed away in 2018, his mother  in the middle of that, and his father in law is currently in the hospital fighting for his life. Pt states he and his wife, Cleo (DANNI), are working a lot to make ends meet. Pt states he is too hard on himself and doesn't want to let anyone down but is having trouble handling all the pressures of life. Pt states he just wants to be happy and is looking for skills, resources, and possible medications to help him achieve that.     Pt states [facility] and [facility]/failed him with his mental health. Pt states he has not reached out for help again until now.     Pt has hx of 1 SI attempt 8 years ago.     Pt is Type 2 Diabetic.     Pt served in the  from [id number].     Pt received a one time dose of lisinopril (telephone with readback per Mamie Velazquez) for elevated BP at 2030.         Problem: Self Harm/Suicidality  Goal: Will have no self-injury during hospital stay  Outcome: Progressing     Problem: Anxiety  Goal: Will report anxiety at manageable levels  Outcome: Progressing     Problem: Coping  Goal: Pt/Family able to verbalize concerns and demonstrate effective coping strategies  Outcome: Progressing     Problem: Behavior  Goal: Pt/Family maintain appropriate behavior and adhere to behavioral management agreement, if implemented  Outcome: Progressing     Problem: Depression/Self Harm  Goal: Effect of psychiatric condition will be minimized and patient will be protected  from self harm  Outcome: Progressing

## 2024-01-19 NOTE — BH NOTE
[facility]  Treatment Team Note  Review Date & Time: 1/14/24 1000      Patient was not in treatment team      Status EXAM:   Mental Status and Behavioral Exam  Normal: No  Level of Assistance: Independent/Self  Facial Expression: Sad  Affect: Congruent  Level of Consciousness: Alert  Frequency of Checks: 4 times per hour, close  Mood:Normal: No  Mood: Depressed, Sad  Motor Activity:Normal: Yes  Eye Contact: Good  Observed Behavior: Cooperative, Friendly, Tearful  Sexual Misconduct History: Current - no  Preception: [place] to person, [place] to time, [place] to place, [place] to situation  Attention:Normal: No  Attention: Distractible  Thought Processes: Other (comment) (racing thoughts)  Thought Content:Normal: Yes  Depression Symptoms: Crying, Feelings of helplessness, Feelings of hopelessess, Feelings of worthlessness, Loss of interest  Anxiety Symptoms: Generalized  Lyn Symptoms: No problems reported or observed.  Hallucinations: None  Delusions: No  Memory:Normal: Yes  Insight and Judgment: No  Insight and Judgment: Poor judgment, Poor insight      Suicide Risk CSSR-S:  1) Within the past month, have you wished you were dead or wished you could go to sleep and not wake up? : Yes  2) Have you actually had any thoughts of killing yourself? : Yes  3) Have you been thinking about how you might kill yourself? : Yes  5) Have you started to work out or worked out the details of how to kill yourself? Do you intend to carry out this plan? : Yes  6) Have you ever done anything, started to do anything, or prepared to do anything to end your life?: Yes      PLAN/TREATMENT RECOMMENDATIONS UPDATE: Patient will take medication as prescribed, eat 75% of meals, attend groups, participate in milieu activities, participate in treatment team and care planning for discharge and follow up.           Pearl Chang RN

## 2024-01-19 NOTE — PROGRESS NOTES
4 Eyes Skin Assessment     The patient is being assessed for  Admission    I agree that 2 RN's have performed a thorough Head to Toe Skin Assessment on the patient. ALL assessment sites listed below have been assessed.       Areas assessed for pressure by both nurses:   [x]   Head, Face, and Ears   [x]   Shoulders, Back, and Chest  [x]   Arms, Elbows, and Hands   []   Coccyx, Sacrum, and Ischum  [x]   Legs, Feet, and Heels  Pt refused to let staff look at coccyx, sacrum, and ischum but states he has no issues in those areas                                Skin Assessed Under all Medical Devices by both nurses: No medical devices    All Mepilex Borders were peeled back and area peeked at by both nurses: No mepilex borders                 Does the Patient have Skin Breakdown related to pressure?  No            [place]ravo Prevention initiated:  NA   Wound Care Orders initiated:  NA      [facility] nurse consulted for Pressure Injury (Stage 3,4, Unstageable, DTI, NWPT, Complex wounds)and New or Established Ostomies:  NA        Nurse 1 eSignature: Electronically signed by Tess Lee RN on 1/19/24 at 12:13 AM EST    **SHARE this note so that the co-signing nurse is able to place an eSignature**    Nurse 2 eSignature: Electronically signed by Bernie Urbina RN on 1/19/24 at 12:16 AM EST

## 2024-01-19 NOTE — PLAN OF CARE
Jordan is alert and oriented. He was angry this am due to being admitted here. He states he had a very bad day at work and had a fight with his boss. He states he began to shake and yell at his boss. He states he stated he has had thoughts of suicide at different times in his life and he planned to hang himself. He states he has been depressed for a long time. His daughter had a near drowning at the age of two and was anoxic for 37 minutes. She never physically or mentally recovered. In 2018 she had an illness, as she did every fall, and they expected her to recover. She did not survive. She was 13 years old. He was tearful when he spoke about her. He was angry stating \"I need to be out of here. I need my wife, she is my rock.\" After a long conversation, he calmed down and was pleasant. He was able to tell stories about his job, and he laughed. He was discharged home with his wife. He denied any needs and states he will take Zoloft as ordered.

## 2024-01-19 NOTE — PROGRESS NOTES
[facility]  Admission Note     Admission Type:   Admission Type: Voluntary    Reason for admission:  Reason for Admission: SI      Addictive Behavior:   Addictive Behavior  In the Past 3 Months, Have You Felt or Has Someone Told You That You Have a Problem With  : None    Medical Problems:   Past Medical History:   Diagnosis Date    PTSD (post-traumatic stress disorder)        Status EXAM:  Mental Status and Behavioral Exam  Normal: No  Level of Assistance: Independent/Self  Facial Expression: Sad  Affect: Congruent  Level of Consciousness: Alert  Frequency of Checks: 4 times per hour, close  Mood:Normal: No  Mood: Depressed, Sad  Motor Activity:Normal: Yes  Eye Contact: Good  Observed Behavior: Cooperative, Friendly, Tearful  Sexual Misconduct History: Current - no  Preception: [place] to person, [place] to time, [place] to place, [place] to situation  Attention:Normal: No  Attention: Distractible  Thought Processes: Other (comment) (racing thoughts)  Thought Content:Normal: Yes  Depression Symptoms: Crying, Feelings of helplessness, Feelings of hopelessess, Feelings of worthlessness, Loss of interest  Anxiety Symptoms: Generalized  Lyn Symptoms: No problems reported or observed.  Hallucinations: None  Delusions: No  Memory:Normal: Yes  Insight and Judgment: No  Insight and Judgment: Poor judgment, Poor insight    Tobacco Screening:  Practical Counseling, on admission, mary X, if applicable and completed (first 3 are required if patient doesn't refuse):            ( ) Recognizing danger situations (included triggers and roadblocks)                    ( ) Coping skills (new ways to manage stress,relaxation techniques, changing routine, distraction)                                                           ( ) Basic information about quitting (benefits of quitting, techniques in how to quit, available resources  ( ) [facility]

## 2024-01-19 NOTE — H&P
[address] [address]                              HISTORY AND PHYSICAL    PATIENT NAME: BERENICE HOOK                    :        1980  MED REC NO:   [id number]                          ROOM:       [id number]  ACCOUNT NO:   [id number]                           ADMIT DATE: 2024  PROVIDER:     Wei Gunderson MD    PSYCHIATRY HISTORY AND PHYSICAL    DATE OF EVALUATION:  2024    IDENTIFICATION:  This is a domiciled, , and fully employed  43-year-old with history of PTSD, who self-presented to [facility]  Emergency Department with worsening depressed mood and thoughts of  suicide.    SOURCES OF INFORMATION:  The patient.  Focused record review.    CHIEF COMPLAINT:  \"I had a bad day.  I'm okay now.\"    HISTORY OF PRESENT ILLNESS:  The patient reports he has struggled with  symptoms of PTSD off and on for a number of years.  He also struggled  with symptoms of depression.    In the setting of increased stressors, his depression has gotten a  worse.  He endorses low mood, trouble concentrating, decreased  motivation, fatigue, feelings of excessive guilt, self-reproach, and  intermittent thoughts of suicide.  He has had the thoughts  of suicide off and on for a number of years' they get worse during  times of stress.    Yesterday, he had a series of stressful events that culminated an acute  worsening of mood and increased thoughts of suicide.  Because of this,  he presented to [facility] Emergency Department for assessment.    He reports that since transfer here, he has felt better though continues  to struggle.  He says yesterday was a particularly hard day for him for  various reasons, but with the resolution of those stressors, he has  improved.  His wife is his primary support; they have healthy  relationship.    He hopes to start treatment here and discharge with  outpatient referrals.  He

## 2024-01-20 LAB
EST. AVERAGE GLUCOSE BLD GHB EST-MCNC: 145.6 MG/DL
HBA1C MFR BLD: 6.7 %

## 2024-01-20 NOTE — DISCHARGE SUMMARY
Department of Psychiatry    Discharge Summary      Jordan Hernandez  [id number]    Admission date:   1/18/2024    Discharge:   Date: 1/19/2024   Location: home    Inpatient Provider: Wei Gunderson MD  Unit: [facility]    Diagnosis on Admission:  Severe episode of recurrent major depressive disorder, without psychotic features    Diagnosis on Discharge:  Severe episode of recurrent major depressive disorder, without psychotic features    Active Hospital Problems    Diagnosis Date Noted    Type 2 diabetes mellitus (HCC) [E11.9] 01/19/2024    Sleep apnea, unspecified [G47.30] 01/19/2024    Morbid obesity (HCC) [E66.01] 01/19/2024    Hyperlipidemia [E78.5] 01/19/2024    Gastroesophageal reflux disease [K21.9] 01/19/2024    Essential (primary) hypertension [I10] 01/19/2024    Post-traumatic stress disorder, chronic [F43.12] 01/19/2024    Severe episode of recurrent major depressive disorder, without psychotic features (HCC) [F33.2] 01/18/2024     Reason for Admission and Hospital Course:     From my admission note:  IDENTIFICATION:  This is a domiciled, , and fully employed  43-year-old with history of PTSD, who self-presented to [facility]  Emergency Department with worsening depressed mood and thoughts of  suicide.     SOURCES OF INFORMATION:  The patient.  Focused record review.     CHIEF COMPLAINT:  \"I had a bad day.  I'm okay now.\"     HISTORY OF PRESENT ILLNESS:  The patient reports he has struggled with  symptoms of PTSD off and on for a number of years.  He also struggled  with symptoms of depression.     In the setting of increased stressors, his depression has gotten a  worse.  He endorses low mood, trouble concentrating, decreased  motivation, fatigue, feelings of excessive guilt, self-reproach, and  intermittent thoughts of suicide.  He has had the thoughts  of suicide off and on for a number of years' they get worse during  times of stress.     Yesterday, he had a series of stressful events that  domiciled, , and fully employed  43-year-old  with a history of PTSD, who self-presented to [facility] Emergency Department with acute depression and thoughts of  suicide.  He meets criteria for major depressive episode and PTSD.     He was acutely suicidal in the setting of a number of stressors.  Those  stressors have since resolved and so has that suicidality.  He   reports having thoughts of suicide off and on for many years. They  get worse in times of stress.     Now, he is future oriented.  He has demonstrated safe behavior since  admission.  His wife is very supportive of him and is here visiting.   They do not have weapons at home.  The patient is interested in   starting an antidepressant and outpatient referrals.      Given this, it is my opinion he does not meet criteria for further  involuntary care and will be discharged per his request.     DIAGNOSES:  1.  Major depressive disorder, recurrent, severe, without psychotic  features.  2.  PTSD.  3.  Hypertension.  4.  GERD.  5.  Hyperlipidemia.  6.  Sleep apnea.  7.  Type 2 diabetes.     PLAN:  1.  Discharge for as reasons described above.  2.  Discussed treatment options at length.  Agreed to start Zoloft 25 mg  today and increase to 50 mg tomorrow.  Discussed the risks, benefits,  and side effects at length.  He is in agreement.  3.  Safety plan discussed at length including representing to the  nearest emergency department or calling 911 if his thoughts of self-harm  or suicide return.    Complications: none;  Jordan Hernandez did not require emergency psychiatric intervention during this admission such as restraint or emergency medication.      Vital signs in last 24 hours:  Vitals:    01/19/24 1350   BP: 132/67   Pulse:    Resp:    Temp:    SpO2:      Discharge on regular diet, continue activity as tolerated.     Condition on Discharge:  Jordan Hernandez was in stable condition.  Jordan Hernandez did not have suicidal or homicidal

## 2024-01-23 ENCOUNTER — FOLLOWUP TELEPHONE ENCOUNTER (OUTPATIENT)
Dept: PSYCHIATRY | Age: 44
End: 2024-01-23

## 2025-04-20 ENCOUNTER — HOSPITAL ENCOUNTER (EMERGENCY)
Age: 45
Discharge: HOME | End: 2025-04-20
Attending: EMERGENCY MEDICINE
Payer: OTHER GOVERNMENT

## 2025-04-20 ENCOUNTER — APPOINTMENT (OUTPATIENT)
Dept: GENERAL RADIOLOGY | Age: 45
End: 2025-04-20
Payer: OTHER GOVERNMENT

## 2025-04-20 VITALS
HEIGHT: 69 IN | RESPIRATION RATE: 18 BRPM | DIASTOLIC BLOOD PRESSURE: 98 MMHG | WEIGHT: 251 LBS | TEMPERATURE: 98.3 F | HEART RATE: 83 BPM | OXYGEN SATURATION: 97 % | SYSTOLIC BLOOD PRESSURE: 172 MMHG | BODY MASS INDEX: 37.18 KG/M2
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
## 2025-04-20 DIAGNOSIS — S82.831A CLOSED FRACTURE OF PROXIMAL END OF RIGHT FIBULA, UNSPECIFIED FRACTURE MORPHOLOGY, INITIAL ENCOUNTER: Primary | ICD-10-CM

## 2025-04-20 PROCEDURE — 29515 APPLICATION SHORT LEG SPLINT: CPT

## 2025-04-20 PROCEDURE — 6360000002 HC RX W HCPCS

## 2025-04-20 PROCEDURE — 6370000000 HC RX 637 (ALT 250 FOR IP)

## 2025-04-20 PROCEDURE — 96372 THER/PROPH/DIAG INJ SC/IM: CPT

## 2025-04-20 PROCEDURE — 99284 EMERGENCY DEPT VISIT MOD MDM: CPT

## 2025-04-20 PROCEDURE — 73564 X-RAY EXAM KNEE 4 OR MORE: CPT

## 2025-04-20 RX ORDER — HYDROCODONE BITARTRATE AND ACETAMINOPHEN 5; 325 MG/1; MG/1
1 TABLET ORAL EVERY 6 HOURS PRN
Qty: 10 TABLET | Refills: 0 | Status: SHIPPED | OUTPATIENT
Start: 2025-04-20 | End: 2025-04-23

## 2025-04-20 RX ORDER — HYDROCODONE BITARTRATE AND ACETAMINOPHEN 5; 325 MG/1; MG/1
1 TABLET ORAL ONCE
Status: COMPLETED | OUTPATIENT
Start: 2025-04-20 | End: 2025-04-20

## 2025-04-20 RX ORDER — KETOROLAC TROMETHAMINE 30 MG/ML
30 INJECTION, SOLUTION INTRAMUSCULAR; INTRAVENOUS ONCE
Status: COMPLETED | OUTPATIENT
Start: 2025-04-20 | End: 2025-04-20

## 2025-04-20 RX ADMIN — KETOROLAC TROMETHAMINE 30 MG: 30 INJECTION, SOLUTION INTRAMUSCULAR at 08:59

## 2025-04-20 RX ADMIN — HYDROCODONE BITARTRATE AND ACETAMINOPHEN 1 TABLET: 5; 325 TABLET ORAL at 09:15

## 2025-04-20 ASSESSMENT — PAIN SCALES - GENERAL
PAINLEVEL_OUTOF10: 8
PAINLEVEL_OUTOF10: 8
PAINLEVEL_OUTOF10: 3

## 2025-04-20 ASSESSMENT — PAIN DESCRIPTION - DESCRIPTORS
DESCRIPTORS: ACHING;SORE;TENDER
DESCRIPTORS: ACHING;SORE;TENDER

## 2025-04-20 ASSESSMENT — PAIN DESCRIPTION - ORIENTATION
ORIENTATION: RIGHT
ORIENTATION: RIGHT

## 2025-04-20 ASSESSMENT — PAIN DESCRIPTION - LOCATION: LOCATION: KNEE

## 2025-04-20 ASSESSMENT — PAIN DESCRIPTION - PAIN TYPE: TYPE: ACUTE PAIN

## 2025-04-20 ASSESSMENT — PAIN - FUNCTIONAL ASSESSMENT: PAIN_FUNCTIONAL_ASSESSMENT: 0-10

## 2025-04-20 NOTE — ED PROVIDER NOTES
[facility] EMERGENCY DEPARTMENT  EMERGENCY DEPARTMENT ENCOUNTER        Pt Name: Jordan Hernandez  MRN: [id number]  Birthdate 1980  Date of evaluation: 4/20/2025  Provider: JIL Sanchez CNP  PCP: No primary care provider on file.  Note Started: 10:38 AM EDT 4/20/25       I have seen and evaluated this patient with my supervising physician Rahat Gary MD.      CHIEF COMPLAINT       Chief Complaint   Patient presents with    Knee Pain     Pt fell outside this morning hurting rt knee. Missed a step outside, pain on rt knee and rt lateral leg.        HISTORY OF PRESENT ILLNESS: 1 or more Elements     History From: Patient, family member at bedside, EMR review    Chief Complaint: Right knee pain    Jordan Hernandez is a 44 y.o. male who presents to the emergency department for evaluation of right knee pain.  States that he was taking his dog outside around 3:00 this morning.  Reports that he is visiting family in this area and there is noted uneven steps.  He missed the last step resulting in a mechanical fall.  He landed on his knee.  Denies any associated head injury.  States that when he landed on his knee he felt a pop.  Is able to ambulate although this does reproduce pain.  Pain with movement and ambulation reported as 8 out of 10 intensity.  With rest 0 out of 10 in intensity.  No prior orthopedic injuries or surgeries to the affected extremity.  Did take 1 g of Tylenol at 3:51 AM.  No additional treatments.    Nursing Notes were all reviewed and agreed with or any disagreements were addressed in the HPI.    REVIEW OF SYSTEMS :      Review of Systems   Respiratory:  Negative for chest tightness and shortness of breath.    Cardiovascular:  Negative for chest pain and palpitations.   Gastrointestinal:  Negative for abdominal pain, nausea and vomiting.   Musculoskeletal:  Positive for arthralgias (Right knee). Negative for back pain and neck pain.   Neurological:  Negative for dizziness,

## 2025-04-20 NOTE — ED PROVIDER NOTES
In addition to the advanced practice provider, I personally saw Jordan Hernandez and performed a substantive portion of the visit including all aspects of the medical decision making.    Medical Decision Making  Patient seen and evaluated at bedside. Briefly, he is presenting with right knee pain after an injury fall down his steps. He was given oral Norco and IM Toradol for pain relief. Xray of the right knee showed a comminuted relatively nondisplaced proximal fibular fracture. Patient placed in a posterior splint and provided with crutches. He was provided with a prescription for Norco and advised to follow-up with the provided Orthopedic Surgeon as soon as possible.    EKG  N/A    SEP-1  Is this patient to be included in the SEP-1 Core Measure due to severe sepsis or septic shock?   No     Exclusion criteria - the patient is NOT to be included for SEP-1 Core Measure due to:  2+ SIRS criteria are not met    Screenings     [place] Coma Scale  Eye Opening: Spontaneous  Best Verbal Response: Oriented  Best Motor Response: Obeys commands  Mert Coma Scale Score: 15             Patient Referrals:  Rubens Johnson MD  [address]  [place] [address]  [phone]    Call in 1 day  Emergency department follow-up    [facility]  [address]  [place] [address]  [phone]  Call   to establish with a primary care doctor    [facility] Emergency Department  [address] [place] [address]  [phone]  Go to   If symptoms worsen      Discharge Medications:  Discharge Medication List as of 4/20/2025  9:49 AM        START taking these medications    Details   HYDROcodone-acetaminophen (NORCO) 5-325 MG per tablet Take 1 tablet by mouth every 6 hours as needed for Pain for up to 3 days. Intended supply: 3 days. Take lowest dose possible to manage pain Max Daily Amount: 4 tablets, Disp-10 tablet, R-0Normal             FINAL

## 2025-04-20 NOTE — DISCHARGE INSTRUCTIONS
Your x-ray shows a fracture of the proximal fibula.  This is the nonweightbearing bone of the lower leg.  A splint was applied in the emergency department.  The splint will be maintained until orthopedics instruct you otherwise.  Contact number for orthopedics listed above.    I did send a short course of pain medication to your pharmacy.  Take as directed.  Please note that your pain medication does have a Tylenol component.  Maximum dosing for Tylenol in 24 hours is not to exceed 4 g.  Use extreme caution with taking additional sources of Tylenol.  Okay to rotate with 600-800 mg of Motrin in between doses every 8 hours.  Elevate and apply ice 2-3 times a day for 10 to 15 minutes at a time.    If you have concerns for compartment syndrome (pain out of proportion, discoloration, inability to move the toes on the affected extremity) you can loosen the outer Ace wrap but do not manipulate the internal white portion of the splint.  If you have concerns this can be a medical emergency and requires emergent reevaluation.    Follow-up with primary care as needed.

## 2025-04-21 ENCOUNTER — TELEPHONE (OUTPATIENT)
Dept: ORTHOPEDIC SURGERY | Age: 45
End: 2025-04-21

## 2025-04-21 ENCOUNTER — OFFICE VISIT (OUTPATIENT)
Dept: ORTHOPEDIC SURGERY | Age: 45
End: 2025-04-21
Payer: COMMERCIAL

## 2025-04-21 VITALS — HEIGHT: 69 IN | WEIGHT: 251 LBS | BODY MASS INDEX: 37.18 KG/M2

## 2025-04-21 DIAGNOSIS — M79.604 PAIN OF RIGHT LOWER EXTREMITY: ICD-10-CM

## 2025-04-21 DIAGNOSIS — E55.9 VITAMIN D DEFICIENCY: ICD-10-CM

## 2025-04-21 DIAGNOSIS — S82.831A CLOSED FRACTURE OF PROXIMAL END OF RIGHT FIBULA, UNSPECIFIED FRACTURE MORPHOLOGY, INITIAL ENCOUNTER: Primary | ICD-10-CM

## 2025-04-21 PROCEDURE — 99204 OFFICE O/P NEW MOD 45 MIN: CPT | Performed by: ORTHOPAEDIC SURGERY

## 2025-04-21 PROCEDURE — L4361 PNEUMA/VAC WALK BOOT PRE OTS: HCPCS | Performed by: ORTHOPAEDIC SURGERY

## 2025-04-21 NOTE — TELEPHONE ENCOUNTER
PATIENT SCHEDULED TO COME IN TODAY AT 130P AT THE [facility].  RIGHT FIBULA FRACTURE.

## 2025-04-21 NOTE — TELEPHONE ENCOUNTER
----- Message from Dr. Rubens Johnson MD sent at 4/21/2025  9:13 AM EDT -----  F/u  ----- Message -----  From: Rahat Gary DO  Sent: 4/20/2025   9:56 PM EDT  To: Rubens Johnson MD

## 2025-04-21 NOTE — PROGRESS NOTES
ORTHOPAEDIC SURGERY H&P / CONSULTATION NOTE    Chief complaint:   Chief Complaint   Patient presents with    Wrist Pain     NP R KNEE/ FIBULA      History of present illness: The patient is a 44 y.o. male with subjective symptoms of right leg injury. The chief complaint is located at lateral aspect proximal of the right leg. Duration of symptoms has been for 1 day. The severity of symptoms is rated at 0/10 pain however is high 6/10 pain on intake form.  Patient had ultimately fallen and got his leg trapped underneath him and it twisted it.  He states pain along the lateral aspect of the leg.  He went to the emergency room and ultimately was placed in a splint.  He has been taking Tylenol.  He works as a  at [employer].  He states previous ankle sprains.    The patient has tried the below listed items prior to today's consultation for above listed chief complaint.     -   Over-the-counter anti-inflammatories/prescription medication anti-inflammatory.     -   Physical therapy / guided home exercise program -     -   Previous corticosteroid injections    Past medical history:    Past Medical History:   Diagnosis Date    PTSD (post-traumatic stress disorder)         Past surgical history:    Past Surgical History:   Procedure Laterality Date    CHOLECYSTECTOMY          Allergies:  No Known Allergies      Medications:   Current Outpatient Medications:     HYDROcodone-acetaminophen (NORCO) 5-325 MG per tablet, Take 1 tablet by mouth every 6 hours as needed for Pain for up to 3 days. Intended supply: 3 days. Take lowest dose possible to manage pain Max Daily Amount: 4 tablets, Disp: 10 tablet, Rfl: 0    insulin lispro (HUMALOG) 100 UNIT/ML SOLN injection vial, Inject 20 Units into the skin daily (with breakfast), Disp: 1 each, Rfl: 0    insulin lispro (HUMALOG) 100 UNIT/ML SOLN injection vial, Inject 20 Units into the skin Daily with lunch, Disp: 1 each, Rfl: 0    insulin lispro (HUMALOG) 100

## 2025-04-22 ENCOUNTER — RESULTS FOLLOW-UP (OUTPATIENT)
Dept: ORTHOPEDIC SURGERY | Age: 45
End: 2025-04-22

## 2025-04-22 DIAGNOSIS — E55.9 VITAMIN D DEFICIENCY: Primary | ICD-10-CM

## 2025-04-22 LAB — 25(OH)D3 SERPL-MCNC: 6 NG/ML

## 2025-04-22 NOTE — TELEPHONE ENCOUNTER
Spoke with Pt to provide Vit D TR. Informed Pt of vit d supplementation. Pharmacy was confirmed. Prescription has been placed. Pt expressed understanding

## 2025-04-22 NOTE — TELEPHONE ENCOUNTER
----- Message from Dr. Justin Ritter MD sent at 4/22/2025  7:43 AM EDT -----  Please provide patient lab result.  Vitamin D deficient.  Start ergocalciferol 50,000 units p.o. weekly #12 0 refill

## 2025-04-23 ENCOUNTER — TELEPHONE (OUTPATIENT)
Dept: ORTHOPEDIC SURGERY | Age: 45
End: 2025-04-23

## 2025-04-23 NOTE — TELEPHONE ENCOUNTER
----- Message from Stephanie RAJPUT sent at 4/23/2025 10:37 AM EDT -----  Regarding: Specialty Message to Provider  Specialty Message to Provider    Relationship to Patient: Covered Entity [employer]      Patient Message: [employer] IS CALLING IN REQUESTING THE EXPECTED END DATE FOR WORK RESTRICTIONS NOTE. [phone]  --------------------------------------------------------------------------------------------------------------------------

## 2025-04-23 NOTE — TELEPHONE ENCOUNTER
Pt called back, and let him know I would put the end date of 6/2/2025 for his restrictions as that is his next follow up appointment. He is agreeable to this. 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 He will call back if he needs anything else from us before his next follow up appointment.

## 2025-04-23 NOTE — TELEPHONE ENCOUNTER
Attempted to call Pt to discuss work restriction end date prior to faxing letter to employer. Did not answer, so LVM asking that he call me back at my direct line ([phone]) at his convenience.

## 2025-04-23 NOTE — TELEPHONE ENCOUNTER
I have created this new note, sent it to Pt's Boomi and have faxed it to the number provided in the initial phone message for [employer] ([phone])

## 2025-04-24 RX ORDER — ERGOCALCIFEROL 1.25 MG/1
50000 CAPSULE, LIQUID FILLED ORAL WEEKLY
Qty: 12 CAPSULE | Refills: 0 | Status: SHIPPED | OUTPATIENT
Start: 2025-04-24

## 2025-06-02 ENCOUNTER — PATIENT MESSAGE (OUTPATIENT)
Dept: ORTHOPEDIC SURGERY | Age: 45
End: 2025-06-02

## 2025-06-02 ENCOUNTER — OFFICE VISIT (OUTPATIENT)
Dept: ORTHOPEDIC SURGERY | Age: 45
End: 2025-06-02
Payer: COMMERCIAL

## 2025-06-02 VITALS — BODY MASS INDEX: 37.18 KG/M2 | HEIGHT: 69 IN | WEIGHT: 251 LBS

## 2025-06-02 DIAGNOSIS — S82.831D CLOSED FRACTURE OF PROXIMAL END OF RIGHT FIBULA WITH ROUTINE HEALING, UNSPECIFIED FRACTURE MORPHOLOGY, SUBSEQUENT ENCOUNTER: Primary | ICD-10-CM

## 2025-06-02 PROCEDURE — 99213 OFFICE O/P EST LOW 20 MIN: CPT | Performed by: ORTHOPAEDIC SURGERY

## 2025-06-02 NOTE — PROGRESS NOTES
FOLLOW UP ORTHOPAEDIC NOTE    The patient follows up today for reevaluation of right leg. The patient states that/10 pain.  He has been taking his vitamin D replacement given previous finding of vitamin D deficiency of 6.0.  He has been walking around in his boot.  He denies pain with walking in the boot.  He is roughly 6 weeks status post injury     PE:  AAOx3  RR  Unlabored breathing  Skin warm and moist  Focused physical examination of the right leg  Nontender to palpation proximal fibula  Remainder of neurovascular exam unchanged    Pertinent radiographs/imagin view right tibia 2025: Healing fracture with evidence of callus.  No gross displacement of proximal fibula fracture    Vitamin D lab 2025: 6.0     Diagnosis Orders   1. Closed fracture of proximal end of right fibula with routine healing, unspecified fracture morphology, subsequent encounter  XR TIBIA FIBULA RIGHT (2 VIEWS)        Assessment and plan: 44 male with continued subjective symptoms of right leg pain/injury with known, correlating diagnosis of nondisplaced right proximal fibula fracture with continued routine interval healing.  -Time of 12 minutes was spent coordinating and discussing the clinical findings and diagnostic imaging results as they pertain to the patient's presenting subjective symptoms.  - I had a pleasant discussion with the patient that he is doing quite well with his overall healing.  He is grossly vitamin D deficient.  I contacted him and we have started vitamin D dosing and he has been taking this weekly.  He will continue to take this until completed for the full 3 months.  Again I advocated for diet rich in calcium  - He may start to wean out of the boot and wear regular shoes however she was instructed to obtain new fitting pair of sneakers if he has not had a pair in quite some time.  He states that he will do so  - I advocated for low impact activity only elliptical stationary bike swimming and walking at

## 2025-07-28 ENCOUNTER — OFFICE VISIT (OUTPATIENT)
Dept: ORTHOPEDIC SURGERY | Age: 45
End: 2025-07-28
Payer: COMMERCIAL

## 2025-07-28 VITALS — BODY MASS INDEX: 37.18 KG/M2 | HEIGHT: 69 IN | WEIGHT: 251 LBS

## 2025-07-28 DIAGNOSIS — S82.831D CLOSED FRACTURE OF PROXIMAL END OF RIGHT FIBULA WITH ROUTINE HEALING, UNSPECIFIED FRACTURE MORPHOLOGY, SUBSEQUENT ENCOUNTER: Primary | ICD-10-CM

## 2025-07-28 PROCEDURE — 99213 OFFICE O/P EST LOW 20 MIN: CPT | Performed by: ORTHOPAEDIC SURGERY

## 2025-07-28 NOTE — PROGRESS NOTES
FOLLOW UP ORTHOPAEDIC NOTE    The patient follows up today for reevaluation of right leg. The patient states 0/10 pain.  He finished his vitamin D.  He denies symptoms as he was having having weaned out of the boot.    PE:  AAOx3  RR  Unlabored breathing  Skin warm and moist  Focused physical examination of the right leg  Right leg: Nontender to palpation proximal fibula  Skin intact throughout  5/5 IP Q H TA G EHL  SILT DP SP LP MP S S  +2 DP pulse    Pertinent radiographs/imagin view right tibia 2025: Healed proximal fibula fracture with noted callus evident     Diagnosis Orders   1. Closed fracture of proximal end of right fibula with routine healing, unspecified fracture morphology, subsequent encounter  XR TIBIA FIBULA RIGHT (2 VIEWS)        Assessment and plan: 45 male with continued subjective symptoms of right leg injury with known, correlating diagnosis of right leg proximal fibula fracture with routine healing.  -Time of 12 minutes was spent coordinating and discussing the clinical findings and diagnostic imaging results as they pertain to the patient's presenting subjective symptoms.  - I had a pleasant discussion with the patient today.  I reviewed with him that currently his clinical examination is well-appearing.  He is doing quite well in terms of his healing evident also on radiographs  - Activity modification to include low impact elliptical stationary bike swimming and walking  - OTC Tylenol/Aleve per bottle as needed discomfort  - Patient will follow-up with his PCP with regard to long-term vitamin D treatment potentially pending lab work.  -All questions answered to the patient's satisfaction and the patient expressed understanding and agreement with the above listed treatment plan  -Follow up in as needed  -Thank you for the clinical consultation and allowing me to participate in the patient's care.      Electronically signed by Justin Ritter MD on 25 at 8:50 AM EDT